# Patient Record
Sex: FEMALE | Race: WHITE
[De-identification: names, ages, dates, MRNs, and addresses within clinical notes are randomized per-mention and may not be internally consistent; named-entity substitution may affect disease eponyms.]

---

## 2020-07-26 ENCOUNTER — HOSPITAL ENCOUNTER (INPATIENT)
Dept: HOSPITAL 43 - DL.ED | Age: 70
LOS: 2 days | Discharge: HOME | DRG: 645 | End: 2020-07-28
Attending: HOSPITALIST | Admitting: HOSPITALIST
Payer: MEDICARE

## 2020-07-26 DIAGNOSIS — Z79.82: ICD-10-CM

## 2020-07-26 DIAGNOSIS — Z20.828: ICD-10-CM

## 2020-07-26 DIAGNOSIS — M19.90: ICD-10-CM

## 2020-07-26 DIAGNOSIS — J32.4: ICD-10-CM

## 2020-07-26 DIAGNOSIS — H54.7: ICD-10-CM

## 2020-07-26 DIAGNOSIS — Z79.899: ICD-10-CM

## 2020-07-26 DIAGNOSIS — E22.2: ICD-10-CM

## 2020-07-26 DIAGNOSIS — E87.8: ICD-10-CM

## 2020-07-26 DIAGNOSIS — R42: ICD-10-CM

## 2020-07-26 DIAGNOSIS — E87.1: Primary | ICD-10-CM

## 2020-07-26 LAB
ANION GAP SERPL CALC-SCNC: 10.8 MEQ/L (ref 7–13)
ANION GAP SERPL CALC-SCNC: 12.7 MEQ/L (ref 7–13)
CHLORIDE SERPL-SCNC: 83 MMOL/L (ref 98–107)
CHLORIDE SERPL-SCNC: 88 MMOL/L (ref 98–107)
SODIUM SERPL-SCNC: 120 MMOL/L (ref 136–145)
SODIUM SERPL-SCNC: 120 MMOL/L (ref 136–145)

## 2020-07-26 PROCEDURE — U0002 COVID-19 LAB TEST NON-CDC: HCPCS

## 2020-07-26 PROCEDURE — 36415 COLL VENOUS BLD VENIPUNCTURE: CPT

## 2020-07-26 PROCEDURE — 99284 EMERGENCY DEPT VISIT MOD MDM: CPT

## 2020-07-26 PROCEDURE — 96360 HYDRATION IV INFUSION INIT: CPT

## 2020-07-26 PROCEDURE — 85025 COMPLETE CBC W/AUTO DIFF WBC: CPT

## 2020-07-26 PROCEDURE — 80053 COMPREHEN METABOLIC PANEL: CPT

## 2020-07-26 RX ADMIN — HEPARIN SODIUM SCH UNITS: 5000 INJECTION, SOLUTION INTRAVENOUS; SUBCUTANEOUS at 21:39

## 2020-07-26 NOTE — EDM.PDOC
ED HPI GENERAL MEDICAL PROBLEM





- General


Chief Complaint: General


Stated Complaint: EXPOSURE TO COVID DIZZY, SHAKEY


Time Seen by Provider: 07/26/20 13:28


Source of Information: Reports: Patient, RN, RN Notes Reviewed


History Limitations: Reports: No Limitations





- History of Present Illness


INITIAL COMMENTS - FREE TEXT/NARRATIVE: 


Patient presents to ER with complaint of feeling dizzy, shaky, and nauseated.  

Patient states she is a  at the rest area, and is afraid of being exposed

to COVID.  Patient states she has had someone else living with her who has 

Crohn's disease and is deathly afraid of getting COVID.  Patient states she has 

had no known exposure to COVID.  States she got overheated outside the other 

day, and feels she may be dehydrated.  Admits to nausea, denies vomiting, denies

fever chills, denies shortness of breath or chest pains.


Onset: Gradual


  ** Headache


Pain Score (Numeric/FACES): 2





- Related Data


                                    Allergies











Allergy/AdvReac Type Severity Reaction Status Date / Time


 


No Known Allergies Allergy   Verified 07/26/20 12:49











Home Meds: 


                                    Home Meds





. [No Known Home Meds]  07/26/20 [History]











Past Medical History





- Past Health History


Medical/Surgical History: Denies Medical/Surgical History





Social & Family History





- Tobacco Use


Smoking Status *Q: Never Smoker


Second Hand Smoke Exposure: No





- Recreational Drug Use


Recreational Drug Use: No





ED ROS GENERAL





- Review of Systems


Review Of Systems: Comprehensive ROS is negative, except as noted in HPI.





ED EXAM, GENERAL





- Physical Exam


Exam: See Below


Exam Limited By: No Limitations


General Appearance: Alert, WD/WN, No Apparent Distress, Anxious


Eye Exam: Bilateral Eye: EOMI, Normal Inspection


Ears: Normal External Exam, Hearing Grossly Normal


Nose: Normal Inspection


Throat/Mouth: Normal Inspection, Normal Voice, No Airway Compromise, Other (Dry 

mucous membranes)


Head: Atraumatic, Normocephalic


Neck: Normal Inspection, Supple, Non-Tender, Full Range of Motion


Respiratory/Chest: No Respiratory Distress, Lungs Clear, Normal Breath Sounds, 

No Accessory Muscle Use, Chest Non-Tender


Cardiovascular: Normal Peripheral Pulses, Regular Rate, Rhythm, No Edema, No 

Gallop, No JVD, No Murmur, No Rub


Peripheral Pulses: 2+: Radial (L), Radial (R)


GI/Abdominal: Normal Bowel Sounds, Soft, Non-Tender


 (Female) Exam: Deferred


Rectal (Female) Exam: Deferred


Back Exam: Normal Inspection, Full Range of Motion, NT


Extremities: Normal Inspection, Normal Range of Motion, Non-Tender, Normal 

Capillary Refill, No Pedal Edema


Neurological: Alert, Oriented, CN II-XII Intact, Normal Cognition, Normal Gait, 

Normal Reflexes, No Motor/Sensory Deficits


Psychiatric: Normal Affect, Normal Mood, Anxious


Skin Exam: Warm, Dry, Intact, Normal Color, No Rash


Lymphatic: No Adenopathy





Course





- Vital Signs


Last Recorded V/S: 


                                Last Vital Signs











Temp  98.6 F   07/26/20 12:40


 


Pulse  86   07/26/20 12:40


 


Resp  20   07/26/20 12:40


 


BP  149/80 H  07/26/20 12:58


 


Pulse Ox  96   07/26/20 12:40














- Orders/Labs/Meds


Orders: 


                               Active Orders 24 hr











 Category Date Time Status


 


 Admission Diagnosis [ADT] Stat ADT  07/26/20 14:37 Ordered


 


 Admission Status [Patient Status] [ADT] Routine ADT  07/26/20 14:37 Active


 


 UA RFX KALEE AND CULT IF INDIC [URIN] Stat Lab  07/26/20 13:34 Ordered











Labs: 


                                Laboratory Tests











  07/26/20 07/26/20 07/26/20 Range/Units





  12:45 13:43 13:43 


 


WBC   6.2   (5.0-10.0)  10^3/uL


 


RBC   5.25   (4.2-5.4)  10^6/uL


 


Hgb   15.7   (12.0-16.0)  g/dL


 


Hct   44.1   (37.0-47.0)  %


 


MCV   84.0  D   ()  fL


 


MCH   29.9   (27.0-34.0)  pg


 


MCHC   35.6 H   (33.0-35.0)  g/dL


 


Plt Count   263   (150-450)  10^3/uL


 


Neut % (Auto)   66.5   (42.2-75.2)  %


 


Lymph % (Auto)   21.5   (20.5-50.1)  %


 


Mono % (Auto)   11.6 H   (2-8)  %


 


Eos % (Auto)   0.2 L   (1.0-3.0)  %


 


Baso % (Auto)   0.2   (0.0-1.0)  %


 


Sodium    120 L  (136-145)  mmol/L


 


Potassium    3.7  (3.5-5.1)  mmol/L


 


Chloride    83 L  ()  mmol/L


 


Carbon Dioxide    28  (21-32)  mmol/L


 


Anion Gap    12.7  (7-13)  mEq/L


 


BUN    12  (7-18)  mg/dL


 


Creatinine    0.67  (0.55-1.02)  mg/dL


 


Est Cr Clr Drug Dosing    53.48  mL/min


 


Estimated GFR (MDRD)    > 60  


 


BUN/Creatinine Ratio    17.9  (No establ ref range)  


 


Glucose    103 H  (74-99)  mg/dL


 


Calcium    9.5  (8.5-10.1)  mg/dL


 


Total Bilirubin    0.8  (0.2-1.0)  mg/dL


 


AST    36  (15-37)  U/L


 


ALT    39  (14-59)  U/L


 


Alkaline Phosphatase    60  ()  U/L


 


Total Protein    8.3 H  (6.4-8.2)  g/dL


 


Albumin    4.4  (3.4-5.0)  g/dL


 


Globulin    3.9  


 


Albumin/Globulin Ratio    1.1  


 


COVID-19 ([facility]RELL)  Negative    (NEGATIVE)  











Meds: 


Medications














Discontinued Medications














Generic Name Dose Route Start Last Admin





  Trade Name Freq  PRN Reason Stop Dose Admin


 


Sodium Chloride  1,000 mls @ 999 mls/hr  07/26/20 13:34  07/26/20 13:46





  Normal Saline  IV  07/26/20 14:34  999 mls/hr





  .BOLUS ONE   Administration














- Re-Assessments/Exams


Free Text/Narrative Re-Assessment/Exam: 





07/26/20 14:39


Discussed patient case with Dr. Melgoza who agreed to accept the patient for 

inpatient admission. 








Departure





- Departure


Time of Disposition: 14:39


Disposition: Admitted As Inpatient 67


Condition: Fair


Clinical Impression: 


 Hyponatremia, Hypochloremia








- Discharge Information


*PRESCRIPTION DRUG MONITORING PROGRAM REVIEWED*: No


*COPY OF PRESCRIPTION DRUG MONITORING REPORT IN PATIENT ELOY: No


Forms:  ED Department Discharge





Sepsis Event Note (ED)





- Evaluation


Sepsis Screening Result: No Definite Risk





- Focused Exam


Vital Signs: 


                                   Vital Signs











  Temp Pulse Resp BP Pulse Ox


 


 07/26/20 12:58     149/80 H 


 


 07/26/20 12:40  98.6 F  86  20   96














- My Orders


Last 24 Hours: 


My Active Orders





07/26/20 13:34


UA RFX KALEE AND CULT IF INDIC [URIN] Stat 





07/26/20 14:37


Admission Diagnosis [ADT] Stat 


Admission Status [Patient Status] [ADT] Routine 














- Assessment/Plan


Last 24 Hours: 


My Active Orders





07/26/20 13:34


UA RFX KALEE AND CULT IF INDIC [URIN] Stat 





07/26/20 14:37


Admission Diagnosis [ADT] Stat 


Admission Status [Patient Status] [ADT] Routine

## 2020-07-26 NOTE — CT
PROCEDURE INFORMATION: 

Exam: CT Head Without Contrast 

Exam date and time: 7/26/2020 5:15 PM 

Age: 70 years old 

Clinical indication: Pain; Headache 



TECHNIQUE: 

Imaging protocol: Computed tomography of the head without contrast. 

Radiation optimization: All CT scans at this facility use at least one of these 

dose optimization techniques: automated exposure control; mA and/or kV 

adjustment per patient size (includes targeted exams where dose is matched to 

clinical indication); or iterative reconstruction. 



COMPARISON: 

No relevant prior studies available. 



FINDINGS: 

Brain: Normal. No hemorrhage. Unremarkable white matter. No mass effect. 

Ventricles: Normal. No ventriculomegaly. 



Bones/joints:  No acute fracture. 

Sinuses: Visualized sinuses are unremarkable. No fluid levels. 

Mastoid air cells: Visualized mastoid air cells are well aerated. 

Soft tissues: No acute changes 



IMPRESSION: 

No acute intracranial abnormality.

## 2020-07-26 NOTE — PCM.HP
H&P History of Present Illness





- General


Date of Service: 07/26/20


Admit Problem/Dx: 


                           Admission Diagnosis/Problem





Admission Diagnosis/Problem      Hyponatremia








Source of Information: Patient


History Limitations: Reports: No Limitations





- History of Present Illness


Initial Comments - Free Text/Narative: 





This is a 70-year-old female with no significant past medical history. The pat

jose presented to the emergency room because of dizziness which has been going 

on for at least the past 2 weeks. It has gradually worsened. She describes it as

her head feeling congested. She does of associated headache which is mild but 

constant and has not responded to oral over-the-counter analgesics. She has 

associated nausea and dry heaves. Has not been vomiting. Also has not had 

diarrhea or constipation. Her appetite in the past 2 days has not been great. 

Patient denies dysuria or frequency or micturition. Denies significant cough or 

wheezing. She does not think she has lost any significant weight. In the 

emergency room the patient was noted to be hyponatremic with sodium of 120.


  ** Headache


Pain Score (Numeric/FACES): 2





- Related Data


Allergies/Adverse Reactions: 


                                    Allergies











Allergy/AdvReac Type Severity Reaction Status Date / Time


 


No Known Allergies Allergy   Verified 07/26/20 15:27











Home Medications: 


                                    Home Meds





. [No Known Home Meds]  07/26/20 [History]











Past Medical History





- Past Health History


Medical/Surgical History: Denies Medical/Surgical History


HEENT History: Reports: Impaired Vision


Musculoskeletal History: Reports: Arthritis





- Infectious Disease History


Infectious Disease History: Reports: Chicken Pox





Social & Family History





- Tobacco Use


Smoking Status *Q: Never Smoker


Second Hand Smoke Exposure: No





- Caffeine Use


Caffeine Use: Reports: Coffee





- Recreational Drug Use


Recreational Drug Use: No





H&P Review of Systems





- Review of Systems:


Review Of Systems: See Below


General: Reports: Malaise, Weakness, Fatigue


HEENT: Reports: No Symptoms


Pulmonary: Reports: No Symptoms


Cardiovascular: Reports: No Symptoms


Gastrointestinal: Reports: No Symptoms


Genitourinary: Reports: No Symptoms


Musculoskeletal: Reports: No Symptoms


Psychiatric: Reports: No Symptoms


Neurological: Reports: Dizziness, Headache, Weakness





Exam





- Exam


Exam: See Below





- Vital Signs


Vital Signs: 


                                Last Vital Signs











Temp  36.4 C   07/26/20 14:46


 


Pulse  68   07/26/20 14:46


 


Resp  20   07/26/20 14:46


 


BP  139/82   07/26/20 14:46


 


Pulse Ox  100   07/26/20 14:46











Weight: 43.001 kg





- Exam


General: Alert, Oriented, Cooperative


Neck: Supple, Trachea Midline, 2


Lungs: Clear to Auscultation


Cardiovascular: Regular Rate, Regular Rhythm, Normal S1


GI/Abdominal Exam: Normal Bowel Sounds, Soft, Non-Tender, No Organomegaly, No 

Distention, No Abnormal Bruit, No Mass, Pelvis Stable


Extremities: Normal Inspection, Normal Range of Motion, Non-Tender, No Pedal 

Edema, Normal Capillary Refill


Skin: Warm, Dry, Intact


Psychiatric: Alert, Normal Affect, Normal Mood





- Patient Data


Lab Results Last 24 hrs: 


                         Laboratory Results - last 24 hr











  07/26/20 07/26/20 07/26/20 Range/Units





  12:45 13:43 13:43 


 


WBC   6.2   (5.0-10.0)  10^3/uL


 


RBC   5.25   (4.2-5.4)  10^6/uL


 


Hgb   15.7   (12.0-16.0)  g/dL


 


Hct   44.1   (37.0-47.0)  %


 


MCV   84.0  D   ()  fL


 


MCH   29.9   (27.0-34.0)  pg


 


MCHC   35.6 H   (33.0-35.0)  g/dL


 


Plt Count   263   (150-450)  10^3/uL


 


Neut % (Auto)   66.5   (42.2-75.2)  %


 


Lymph % (Auto)   21.5   (20.5-50.1)  %


 


Mono % (Auto)   11.6 H   (2-8)  %


 


Eos % (Auto)   0.2 L   (1.0-3.0)  %


 


Baso % (Auto)   0.2   (0.0-1.0)  %


 


Sodium    120 L  (136-145)  mmol/L


 


Potassium    3.7  (3.5-5.1)  mmol/L


 


Chloride    83 L  ()  mmol/L


 


Carbon Dioxide    28  (21-32)  mmol/L


 


Anion Gap    12.7  (7-13)  mEq/L


 


BUN    12  (7-18)  mg/dL


 


Creatinine    0.67  (0.55-1.02)  mg/dL


 


Est Cr Clr Drug Dosing    53.48  mL/min


 


Estimated GFR (MDRD)    > 60  


 


BUN/Creatinine Ratio    17.9  (No establ ref range)  


 


Glucose    103 H  (74-99)  mg/dL


 


Calcium    9.5  (8.5-10.1)  mg/dL


 


Total Bilirubin    0.8  (0.2-1.0)  mg/dL


 


AST    36  (15-37)  U/L


 


ALT    39  (14-59)  U/L


 


Alkaline Phosphatase    60  ()  U/L


 


Total Protein    8.3 H  (6.4-8.2)  g/dL


 


Albumin    4.4  (3.4-5.0)  g/dL


 


Globulin    3.9  


 


Albumin/Globulin Ratio    1.1  


 


Urine Color     (YELLOW)  


 


Urine Appearance     (CLEAR)  


 


Urine pH     (5.0-9.0)  


 


Ur Specific Gravity     (1.005-1.030)  


 


Urine Protein     (NEGATIVE)  


 


Urine Glucose (UA)     (NEGATIVE)  


 


Urine Ketones     (NEGATIVE)  


 


Urine Occult Blood     (NEGATIVE)  


 


Urine Nitrite     (NEGATIVE)  


 


Urine Bilirubin     (NEGATIVE)  


 


Urine Urobilinogen     (0.2-1.0)  mg/dL


 


Ur Leukocyte Esterase     (NEGATIVE)  


 


Urine RBC     /HPF


 


Urine WBC     (0-5/HPF)  /HPF


 


Ur Epithelial Cells     (NOT SEEN)  /HPF


 


Urine Bacteria     (0-FEW/HPF)  /HPF


 


COVID-19 ([facility])  Negative    (NEGATIVE)  














  07/26/20 Range/Units





  16:00 


 


WBC   (5.0-10.0)  10^3/uL


 


RBC   (4.2-5.4)  10^6/uL


 


Hgb   (12.0-16.0)  g/dL


 


Hct   (37.0-47.0)  %


 


MCV   ()  fL


 


MCH   (27.0-34.0)  pg


 


MCHC   (33.0-35.0)  g/dL


 


Plt Count   (150-450)  10^3/uL


 


Neut % (Auto)   (42.2-75.2)  %


 


Lymph % (Auto)   (20.5-50.1)  %


 


Mono % (Auto)   (2-8)  %


 


Eos % (Auto)   (1.0-3.0)  %


 


Baso % (Auto)   (0.0-1.0)  %


 


Sodium   (136-145)  mmol/L


 


Potassium   (3.5-5.1)  mmol/L


 


Chloride   ()  mmol/L


 


Carbon Dioxide   (21-32)  mmol/L


 


Anion Gap   (7-13)  mEq/L


 


BUN   (7-18)  mg/dL


 


Creatinine   (0.55-1.02)  mg/dL


 


Est Cr Clr Drug Dosing   mL/min


 


Estimated GFR (MDRD)   


 


BUN/Creatinine Ratio   (No establ ref range)  


 


Glucose   (74-99)  mg/dL


 


Calcium   (8.5-10.1)  mg/dL


 


Total Bilirubin   (0.2-1.0)  mg/dL


 


AST   (15-37)  U/L


 


ALT   (14-59)  U/L


 


Alkaline Phosphatase   ()  U/L


 


Total Protein   (6.4-8.2)  g/dL


 


Albumin   (3.4-5.0)  g/dL


 


Globulin   


 


Albumin/Globulin Ratio   


 


Urine Color  Yellow  (YELLOW)  


 


Urine Appearance  Clear  (CLEAR)  


 


Urine pH  6.5  (5.0-9.0)  


 


Ur Specific Gravity  1.025  (1.005-1.030)  


 


Urine Protein  Trace H  (NEGATIVE)  


 


Urine Glucose (UA)  Negative  (NEGATIVE)  


 


Urine Ketones  15 H  (NEGATIVE)  


 


Urine Occult Blood  Trace-lysed H  (NEGATIVE)  


 


Urine Nitrite  Negative  (NEGATIVE)  


 


Urine Bilirubin  Negative  (NEGATIVE)  


 


Urine Urobilinogen  0.2  (0.2-1.0)  mg/dL


 


Ur Leukocyte Esterase  Negative  (NEGATIVE)  


 


Urine RBC  0-5  /HPF


 


Urine WBC  0-5  (0-5/HPF)  /HPF


 


Ur Epithelial Cells  Rare  (NOT SEEN)  /HPF


 


Urine Bacteria  Not seen  (0-FEW/HPF)  /HPF


 


COVID-19 ([facility])   (NEGATIVE)  











Result Diagrams: 


                                 07/26/20 13:43





                                 07/26/20 13:43


Problem List Initiated/Reviewed/Updated: Yes


Orders Last 24hrs: 


                               Active Orders 24 hr











 Category Date Time Status


 


 Admission Diagnosis [ADT] Stat ADT  07/26/20 14:37 Ordered


 


 Admission Status [Patient Status] [ADT] Routine ADT  07/26/20 14:37 Active


 


 Patient Status [ADT] Routine ADT  07/26/20 14:46 Active


 


 Cardiac Monitoring [RC] 08,20 Care  07/26/20 14:48 Active


 


 Up ad Esther [RC] ASDIRECTED Care  07/26/20 14:46 Active


 


 VTE/DVT Education [RC] PER UNIT ROUTINE Care  07/26/20 14:46 Active


 


 Vital Signs [RC] 00,04,08,12,16,20 Care  07/26/20 14:46 Active


 


 PT Evaluation and Treatment [CONS] Routine Cons  07/26/20 14:46 Active


 


 Regular Diet [DIET] Diet  07/26/20 Lunch Active


 


 CXR [Chest 1V Frontal] [CR] Routine Exams  07/26/20 16:55 Ordered


 


 Head wo Cont [CT] Routine Exams  07/26/20 16:56 Ordered


 


 BASIC METABOLIC PANEL,BMP [CHEM] AM Lab  07/27/20 05:11 Ordered


 


 BASIC METABOLIC PANEL,BMP [CHEM] Q6H Lab  07/26/20 20:53 Ordered


 


 CBC W/O DIFF,HEMOGRAM [HEME] AM Lab  07/27/20 05:11 Ordered


 


 Acetaminophen [Tylenol] Med  07/26/20 14:46 Active





 650 mg PO Q4H PRN   


 


 Heparin Sodium Med  07/26/20 22:00 Active





 5,000 units SUBCUT Q8HR   


 


 Ondansetron [Zofran] Med  07/26/20 14:46 Active





 4 mg IVPUSH Q6H PRN   


 


 Sodium Chloride 0.9% [Normal Saline] 1,000 ml Med  07/26/20 15:00 Active





 IV ASDIRECTED   


 


 Resuscitation Status Routine Resus Stat  07/26/20 14:46 Ordered








                                Medication Orders





Acetaminophen (Tylenol)  650 mg PO Q4H PRN


   PRN Reason: Pain (Mild 1-3)/fever


Heparin Sodium (Porcine) (Heparin Sodium)  5,000 units SUBCUT Q8HR JENNIFFER


Sodium Chloride (Normal Saline)  1,000 mls @ 125 mls/hr IV ASDIRECTED JENNIFFER


   Last Admin: 07/26/20 15:45  Dose: 125 mls/hr


   Documented by: JEWEL


Ondansetron HCl (Zofran)  4 mg IVPUSH Q6H PRN


   PRN Reason: Nausea/Vomiting


   Last Admin: 07/26/20 15:55  Dose: 4 mg


   Documented by: JEWEL








Assessment/Plan Comment:: 





Assessment/plan:


#. Hyponatremia


Reason for this is not obvious at this point


It may be due to SIADH. The patient is having nausea and dry heaves.


Has not vomited and does not have diarrhea





#. Dizziness


Etiology is unclear at this point


Patient does complain of associated headache


I'll like to exclude central nervous system disorder.





#. Nausea and dry heaves


Again, etiology remains elusive





Plan:


Admit patient to medical floor


Send sample for around a urine sodium


Send sample for random urine creatinine


Obtain CT scan of the head


Obtain chest x-ray


Obtain serial basic metabolic panel every 6 hours


Start patient on intravenous normal saline going at 125 mL an hour





Patient's medical chart was reviewed. Discussed with the emergency room 

physician assistant.

## 2020-07-26 NOTE — CR
PROCEDURE INFORMATION: 

Exam: XR Chest, 2 Views 

Exam date and time: 7/26/2020 4:58 PM 

Age: 70 years old 

Clinical indication: Other: Weakness 



TECHNIQUE: 

Imaging protocol: XR of the chest 

Views: 2 views. 



COMPARISON: 

No relevant prior studies available. 



FINDINGS: 

Lungs: The lungs are hyperinflated, consistent with underlying small airways 

disease. Atelectatic changes noted within the lung bases without focal 

pneumonia. 

Pleural space: Unremarkable. No pleural effusion. No pneumothorax. 

Heart/Mediastinum: The heart demonstrates mild diffuse enlargement. 

Bones/joints: Unremarkable. 



IMPRESSION: 

1. The lungs are hyperinflated, consistent with underlying small airways 

disease. 

2. Atelectatic changes noted within the lung bases without focal pneumonia.

## 2020-07-27 LAB
ANION GAP SERPL CALC-SCNC: 11.5 MEQ/L (ref 7–13)
ANION GAP SERPL CALC-SCNC: 8.8 MEQ/L (ref 7–13)
CHLORIDE SERPL-SCNC: 102 MMOL/L (ref 98–107)
CHLORIDE SERPL-SCNC: 95 MMOL/L (ref 98–107)
SODIUM SERPL-SCNC: 128 MMOL/L (ref 136–145)
SODIUM SERPL-SCNC: 136 MMOL/L (ref 136–145)

## 2020-07-27 RX ADMIN — HEPARIN SODIUM SCH UNITS: 5000 INJECTION, SOLUTION INTRAVENOUS; SUBCUTANEOUS at 13:24

## 2020-07-27 RX ADMIN — HEPARIN SODIUM SCH UNITS: 5000 INJECTION, SOLUTION INTRAVENOUS; SUBCUTANEOUS at 21:46

## 2020-07-27 RX ADMIN — HEPARIN SODIUM SCH UNITS: 5000 INJECTION, SOLUTION INTRAVENOUS; SUBCUTANEOUS at 06:04

## 2020-07-27 NOTE — MR
EXAMINATION: Brain wo Cont  SEX: Female   AGE: 70 years

 

CLINICAL HISTORY: 70-year-old female complaining of persistent dizziness. CT

scan head 26 July 2020 read as "unremarkable. No acute intracranial

abnormality".

 

SCAN TECHNIQUE: Unenhanced sagittal T1 and multisequence (T1/T2/FLAIR/diffusion

weighted imaging) axial images of the head and brain obtained with the patient

lying supine on the [employer] magnet [place], [place]. All data archived in the PACS system for storage,

reformatting and study.

 

INTERPRETATION:

1. Pansinusitis, i.e. mucoperiosteal inflammatory changes involving maxillary

antra and ethmoid sinuses bilaterally (sphenoid and mastoid sinuses clear).

2. Scattered small bright signals identified throughout the periventricular

white matter of both cerebral hemispheres typical of microvascular ischemic

change. Hypertension? Diabetes?

3. No cerebral edema or areas of encephalomalacia (old infarcts). No

demyelinization. No arachnoid cysts.

4. Symmetric normal volume ventricular system. No hydrocephalus.

5. No supratentorial or posterior fossa mass lesion.

6. Cerebellum and brainstem unremarkable.

7. No sign of acute intracranial bleed, i.e. no intracerebral, intraventricular

or subarachnoid blood and no abnormal extracerebral/intracranial epidural or

subdural hematomas.

 

CONCLUSION: Chronic multi-infarct ischemic disease. Pansinusitis.

No intracranial mass, hydrocephalus or bleed.

## 2020-07-27 NOTE — PCM.PN
- General Info


Date of Service: 07/27/20


Subjective Update: 





Patient was admitted with hyponatremia sodium of 120. She was having dizziness, 

generalized weakness and felt unsteady.


Today she indicates that she feels better. Continue to have some dizziness but 

improved compared to yesterday





- Review of Systems


General: Reports: Malaise


HEENT: Reports: No Symptoms


Pulmonary: Reports: No Symptoms


Cardiovascular: Reports: No Symptoms


Gastrointestinal: Reports: No Symptoms


Musculoskeletal: Reports: No Symptoms





- Patient Data


Vitals - Most Recent: 


                                Last Vital Signs











Temp  36.8 C   07/27/20 08:14


 


Pulse  76   07/27/20 08:14


 


Resp  20   07/27/20 08:14


 


BP  132/64   07/27/20 08:14


 


Pulse Ox  96   07/27/20 08:14











Weight - Most Recent: 42.751 kg


I&O - Last 24 Hours: 


                                 Intake & Output











 07/26/20 07/27/20 07/27/20





 22:59 06:59 14:59


 


Intake Total 240 125 


 


Output Total  1100 


 


Balance 240 -975 











Lab Results Last 24 Hours: 


                         Laboratory Results - last 24 hr











  07/26/20 07/26/20 07/26/20 Range/Units





  12:45 13:43 13:43 


 


WBC   6.2   (5.0-10.0)  10^3/uL


 


RBC   5.25   (4.2-5.4)  10^6/uL


 


Hgb   15.7   (12.0-16.0)  g/dL


 


Hct   44.1   (37.0-47.0)  %


 


MCV   84.0  D   ()  fL


 


MCH   29.9   (27.0-34.0)  pg


 


MCHC   35.6 H   (33.0-35.0)  g/dL


 


Plt Count   263   (150-450)  10^3/uL


 


Neut % (Auto)   66.5   (42.2-75.2)  %


 


Lymph % (Auto)   21.5   (20.5-50.1)  %


 


Mono % (Auto)   11.6 H   (2-8)  %


 


Eos % (Auto)   0.2 L   (1.0-3.0)  %


 


Baso % (Auto)   0.2   (0.0-1.0)  %


 


Sodium    120 L  (136-145)  mmol/L


 


Potassium    3.7  (3.5-5.1)  mmol/L


 


Chloride    83 L  ()  mmol/L


 


Carbon Dioxide    28  (21-32)  mmol/L


 


Anion Gap    12.7  (7-13)  mEq/L


 


BUN    12  (7-18)  mg/dL


 


Creatinine    0.67  (0.55-1.02)  mg/dL


 


Est Cr Clr Drug Dosing    53.48  mL/min


 


Estimated GFR (MDRD)    > 60  


 


BUN/Creatinine Ratio    17.9  (No establ ref range)  


 


Glucose    103 H  (74-99)  mg/dL


 


Calcium    9.5  (8.5-10.1)  mg/dL


 


Total Bilirubin    0.8  (0.2-1.0)  mg/dL


 


AST    36  (15-37)  U/L


 


ALT    39  (14-59)  U/L


 


Alkaline Phosphatase    60  ()  U/L


 


Total Protein    8.3 H  (6.4-8.2)  g/dL


 


Albumin    4.4  (3.4-5.0)  g/dL


 


Globulin    3.9  


 


Albumin/Globulin Ratio    1.1  


 


Urine Color     (YELLOW)  


 


Urine Appearance     (CLEAR)  


 


Urine pH     (5.0-9.0)  


 


Ur Specific Gravity     (1.005-1.030)  


 


Urine Protein     (NEGATIVE)  


 


Urine Glucose (UA)     (NEGATIVE)  


 


Urine Ketones     (NEGATIVE)  


 


Urine Occult Blood     (NEGATIVE)  


 


Urine Nitrite     (NEGATIVE)  


 


Urine Bilirubin     (NEGATIVE)  


 


Urine Urobilinogen     (0.2-1.0)  mg/dL


 


Ur Leukocyte Esterase     (NEGATIVE)  


 


Urine RBC     /HPF


 


Urine WBC     (0-5/HPF)  /HPF


 


Ur Epithelial Cells     (NOT SEEN)  /HPF


 


Urine Bacteria     (0-FEW/HPF)  /HPF


 


Ur Random Creatinine     (No establ ref range)  mg/dL


 


Ur Random Sodium     (No establ.ref range)  mmol/L


 


COVID-19 (J[facility]RELL)  Negative    (NEGATIVE)  














  07/26/20 07/26/20 07/26/20 Range/Units





  16:00 16:00 16:00 


 


WBC     (5.0-10.0)  10^3/uL


 


RBC     (4.2-5.4)  10^6/uL


 


Hgb     (12.0-16.0)  g/dL


 


Hct     (37.0-47.0)  %


 


MCV     ()  fL


 


MCH     (27.0-34.0)  pg


 


MCHC     (33.0-35.0)  g/dL


 


Plt Count     (150-450)  10^3/uL


 


Neut % (Auto)     (42.2-75.2)  %


 


Lymph % (Auto)     (20.5-50.1)  %


 


Mono % (Auto)     (2-8)  %


 


Eos % (Auto)     (1.0-3.0)  %


 


Baso % (Auto)     (0.0-1.0)  %


 


Sodium     (136-145)  mmol/L


 


Potassium     (3.5-5.1)  mmol/L


 


Chloride     ()  mmol/L


 


Carbon Dioxide     (21-32)  mmol/L


 


Anion Gap     (7-13)  mEq/L


 


BUN     (7-18)  mg/dL


 


Creatinine     (0.55-1.02)  mg/dL


 


Est Cr Clr Drug Dosing     mL/min


 


Estimated GFR (MDRD)     


 


BUN/Creatinine Ratio     (No establ ref range)  


 


Glucose     (74-99)  mg/dL


 


Calcium     (8.5-10.1)  mg/dL


 


Total Bilirubin     (0.2-1.0)  mg/dL


 


AST     (15-37)  U/L


 


ALT     (14-59)  U/L


 


Alkaline Phosphatase     ()  U/L


 


Total Protein     (6.4-8.2)  g/dL


 


Albumin     (3.4-5.0)  g/dL


 


Globulin     


 


Albumin/Globulin Ratio     


 


Urine Color  Yellow    (YELLOW)  


 


Urine Appearance  Clear    (CLEAR)  


 


Urine pH  6.5    (5.0-9.0)  


 


Ur Specific Gravity  1.025    (1.005-1.030)  


 


Urine Protein  Trace H    (NEGATIVE)  


 


Urine Glucose (UA)  Negative    (NEGATIVE)  


 


Urine Ketones  15 H    (NEGATIVE)  


 


Urine Occult Blood  Trace-lysed H    (NEGATIVE)  


 


Urine Nitrite  Negative    (NEGATIVE)  


 


Urine Bilirubin  Negative    (NEGATIVE)  


 


Urine Urobilinogen  0.2    (0.2-1.0)  mg/dL


 


Ur Leukocyte Esterase  Negative    (NEGATIVE)  


 


Urine RBC  0-5    /HPF


 


Urine WBC  0-5    (0-5/HPF)  /HPF


 


Ur Epithelial Cells  Rare    (NOT SEEN)  /HPF


 


Urine Bacteria  Not seen    (0-FEW/HPF)  /HPF


 


Ur Random Creatinine   57.04   (No establ ref range)  mg/dL


 


Ur Random Sodium    88  (No establ.ref range)  mmol/L


 


COVID-19 ([facility])     (NEGATIVE)  














  07/26/20 07/27/20 07/27/20 Range/Units





  21:00 03:00 03:00 


 


WBC   4.5 L   (5.0-10.0)  10^3/uL


 


RBC   4.32   (4.2-5.4)  10^6/uL


 


Hgb   12.9  D   (12.0-16.0)  g/dL


 


Hct   36.9 L   (37.0-47.0)  %


 


MCV   85.4   ()  fL


 


MCH   29.9   (27.0-34.0)  pg


 


MCHC   35.0   (33.0-35.0)  g/dL


 


Plt Count   203   (150-450)  10^3/uL


 


Neut % (Auto)     (42.2-75.2)  %


 


Lymph % (Auto)     (20.5-50.1)  %


 


Mono % (Auto)     (2-8)  %


 


Eos % (Auto)     (1.0-3.0)  %


 


Baso % (Auto)     (0.0-1.0)  %


 


Sodium  120 L   128 L  (136-145)  mmol/L


 


Potassium  3.8   3.8  (3.5-5.1)  mmol/L


 


Chloride  88 L   95 L  ()  mmol/L


 


Carbon Dioxide  25   28  (21-32)  mmol/L


 


Anion Gap  10.8   8.8  (7-13)  mEq/L


 


BUN  12   11  (7-18)  mg/dL


 


Creatinine  0.63   0.66  (0.55-1.02)  mg/dL


 


Est Cr Clr Drug Dosing  57.21   54.61  mL/min


 


Estimated GFR (MDRD)  > 60   > 60  


 


BUN/Creatinine Ratio     (No establ ref range)  


 


Glucose  103 H   87  (74-99)  mg/dL


 


Calcium  7.9 L D   7.9 L  (8.5-10.1)  mg/dL


 


Total Bilirubin     (0.2-1.0)  mg/dL


 


AST     (15-37)  U/L


 


ALT     (14-59)  U/L


 


Alkaline Phosphatase     ()  U/L


 


Total Protein     (6.4-8.2)  g/dL


 


Albumin     (3.4-5.0)  g/dL


 


Globulin     


 


Albumin/Globulin Ratio     


 


Urine Color     (YELLOW)  


 


Urine Appearance     (CLEAR)  


 


Urine pH     (5.0-9.0)  


 


Ur Specific Gravity     (1.005-1.030)  


 


Urine Protein     (NEGATIVE)  


 


Urine Glucose (UA)     (NEGATIVE)  


 


Urine Ketones     (NEGATIVE)  


 


Urine Occult Blood     (NEGATIVE)  


 


Urine Nitrite     (NEGATIVE)  


 


Urine Bilirubin     (NEGATIVE)  


 


Urine Urobilinogen     (0.2-1.0)  mg/dL


 


Ur Leukocyte Esterase     (NEGATIVE)  


 


Urine RBC     /HPF


 


Urine WBC     (0-5/HPF)  /HPF


 


Ur Epithelial Cells     (NOT SEEN)  /HPF


 


Urine Bacteria     (0-FEW/HPF)  /HPF


 


Ur Random Creatinine     (No establ ref range)  mg/dL


 


Ur Random Sodium     (No establ.ref range)  mmol/L


 


COVID-19 ([facility])     (NEGATIVE)  











Med Orders - Current: 


                               Current Medications





Acetaminophen (Tylenol)  650 mg PO Q4H PRN


   PRN Reason: Pain (Mild 1-3)/fever


Heparin Sodium (Porcine) (Heparin Sodium)  5,000 units SUBCUT Q8HR [facility]


   Last Admin: 07/27/20 06:04 Dose:  5,000 units


   Documented by: 


Ondansetron HCl (Zofran)  4 mg IVPUSH Q6H PRN


   PRN Reason: Nausea/Vomiting


   Last Admin: 07/26/20 15:55 Dose:  4 mg


   Documented by: 


Sodium Chloride (Sodium Chloride)  1 gm PO TID [facility]


   Last Admin: 07/27/20 08:34 Dose:  1 gm


   Documented by: 





Discontinued Medications





Sodium Chloride (Normal Saline)  1,000 mls @ 999 mls/hr IV .BOLUS ONE


   Stop: 07/26/20 14:34


   Last Admin: 07/26/20 13:46 Dose:  999 mls/hr


   Documented by: 


Sodium Chloride (Normal Saline)  1,000 mls @ 75 mls/hr IV ASDIRECTED [facility]


   Last Admin: 07/27/20 00:54 Dose:  75 mls/hr


   Documented by: 











- Exam


General: Alert, Oriented, Cooperative


HEENT: Pupils Equal, Pupils Reactive, EOMI, Mucous Membr. Moist/Pink


Lungs: Clear to Auscultation, Normal Respiratory Effort


Cardiovascular: Regular Rate, Regular Rhythm


GI/Abdominal Exam: Normal Bowel Sounds, Soft, Non-Tender, No Organomegaly, No 

Distention, No Abnormal Bruit, No Mass, Pelvis Stable


Extremities: Normal Inspection, Normal Range of Motion, Non-Tender, No Pedal 

Edema, Normal Capillary Refill





Sepsis Event Note





- Evaluation


Sepsis Screening Result: No Definite Risk





- Focused Exam


Vital Signs: 


                                   Vital Signs











  Temp Pulse Resp BP Pulse Ox


 


 07/27/20 08:14  36.8 C  76  20  132/64  96


 


 07/27/20 04:00  36.4 C  62  18  114/60  97











Date Exam was Performed: 07/27/20


Time Exam was Performed: 10:29





- Problem List Review


Problem List Initiated/Reviewed/Updated: Yes





- My Orders


Last 24 Hours: 


My Active Orders





07/26/20 Lunch


Regular Diet [DIET] 





07/26/20 14:46


Patient Status [ADT] Routine 


Up ad Esther [RC] ASDIRECTED 


VTE/DVT Education [RC] PER UNIT ROUTINE 


Vital Signs [RC] 00,04,08,12,16,20 


PT Evaluation and Treatment [CONS] Routine 


Acetaminophen [Tylenol]   650 mg PO Q4H PRN 


Ondansetron [Zofran]   4 mg IVPUSH Q6H PRN 


Resuscitation Status Routine 





07/26/20 22:00


Heparin Sodium   5,000 units SUBCUT Q8HR 





07/26/20 22:06


Sodium Chloride   1 gm PO TID 





07/27/20 Breakfast


Fluid Restriction [DIET] 





07/27/20 09:30


BASIC METABOLIC PANEL,BMP [CHEM] Routine 





07/27/20 10:22


Brain wo Cont [MR] Routine 





07/28/20 05:11


BASIC METABOLIC PANEL,BMP [CHEM] AM 














- Plan


Plan:: 





Assessment/plan:


#. Hyponatremia





Likely due to SIADH





#. Dizziness


Etiology is unclear at this point


Patient does complain of associated headache


I'll like to exclude central nervous system disorder.





#. Nausea and dry heaves


Again, etiology remains elusive





Plan:


CT scan of the head obtained. No acute intracranial abnormality


Discontinue intravenous fluids


Discontinue telemetry


Obtain MRI of the brain

## 2020-07-28 LAB
ANION GAP SERPL CALC-SCNC: 10.1 MEQ/L (ref 7–13)
CHLORIDE SERPL-SCNC: 103 MMOL/L (ref 98–107)
SODIUM SERPL-SCNC: 140 MMOL/L (ref 136–145)

## 2020-07-28 RX ADMIN — HEPARIN SODIUM SCH: 5000 INJECTION, SOLUTION INTRAVENOUS; SUBCUTANEOUS at 15:06

## 2020-07-28 RX ADMIN — HEPARIN SODIUM SCH UNITS: 5000 INJECTION, SOLUTION INTRAVENOUS; SUBCUTANEOUS at 06:29

## 2020-07-28 NOTE — PCM.[id number]
**Discharge Summary





- Hospital Course


Free Text/Narrative:: 





Patient was admitted with hyponatremia sodium of 120. She was having dizziness, 

generalized weakness and felt unsteady. The patient had a CT scan of the head 

which was unremarkable. Continued to complain of dizziness and lightheadedness. 

Was complaining of pain around the left temporal region. I proceeded to obtain 

MRI of the brain. That showed changes of chronic cerebrovascular accident. 

Patient was placed on fluid restriction and salt tablets. Serum sodium is back 

to normal. She has been started on medications for prevention of cerebrovascular

accident. She will have outpatient carotid ultrasound and echocardiogram for 

complete workup of cerebrovascular accident.





#. Hyponatremia





Likely due to SIADH





#. Dizziness


#. Pansinusitis


#. Nausea and dry heaves


Again, etiology remains elusive





- Discharge Data


Discharge Date: 07/28/20


Discharge Disposition: Home, Self-Care 01


Condition: Stable





- Referral to Home Health


Primary Care Physician: 


Katie Quiles NP








- Patient Summary/Data


Consults: 


                                  Consultations





07/26/20 14:46


PT Evaluation and Treatment [CONS] Routine 














- Patient Instructions


Diet: Usual Diet as Tolerated


Fluid Restriction: 1500 mL


Activity: As Tolerated


Driving: [address] Today


Other/Special Instructions: BMP in one week





- Discharge Plan


*PRESCRIPTION DRUG MONITORING PROGRAM REVIEWED*: No


*COPY OF PRESCRIPTION DRUG MONITORING REPORT IN PATIENT ELOY: No


Prescriptions/Med Rec: 


Aspirin [Aspirin EC] 81 mg PO DAILY 90 Days #90 tablet.


atorvaSTATin Calcium [Lipitor] 20 mg PO DAILY 30 Days #30 tablet


Sodium Chloride 1 gm PO BID 30 Days #60 tablet


Home Medications: 


                                    Home Meds





Aspirin [Aspirin EC] 81 mg PO DAILY 90 Days #90 tablet. 07/28/20 [Rx]


Sodium Chloride 1 gm PO BID 30 Days #60 tablet 07/28/20 [Rx]


atorvaSTATin Calcium [Lipitor] 20 mg PO DAILY 30 Days #30 tablet 07/28/20 [Rx]








Referrals: 


PCP,None [Ordering Only Provider] - 





- Discharge Summary/Plan Comment


DC Time >30 min.: No





- Review of Systems


General: Reports: Malaise


HEENT: Reports: Headaches


Pulmonary: Reports: No Symptoms


Cardiovascular: Reports: No Symptoms


Gastrointestinal: Reports: No Symptoms





- Patient Data


Vitals - Most Recent: 


                                Last Vital Signs











Temp  37.1 C   07/28/20 07:12


 


Pulse  82   07/28/20 07:12


 


Resp  20   07/28/20 07:12


 


BP  148/86 H  07/28/20 07:12


 


Pulse Ox  97   07/28/20 07:12











Weight - Most Recent: 41.277 kg


I&O - Last 24 hours: 


                                 Intake & Output











 07/27/20 07/28/20 07/28/20





 22:59 06:59 14:59


 


Intake Total  100 360


 


Balance  100 360











Lab Results - Last 24 hrs: 


                         Laboratory Results - last 24 hr











  07/28/20 Range/Units





  06:34 


 


Sodium  140  (136-145)  mmol/L


 


Potassium  3.1 L  (3.5-5.1)  mmol/L


 


Chloride  103  ()  mmol/L


 


Carbon Dioxide  30  (21-32)  mmol/L


 


Anion Gap  10.1  (7-13)  mEq/L


 


BUN  13  (7-18)  mg/dL


 


Creatinine  0.61  (0.55-1.02)  mg/dL


 


Est Cr Clr Drug Dosing  56.72  mL/min


 


Estimated GFR (MDRD)  > 60  


 


Glucose  93  (74-99)  mg/dL


 


Calcium  8.6  (8.5-10.1)  mg/dL











Med Orders - Current: 


                               Current Medications





Acetaminophen (Tylenol)  650 mg PO Q4H PRN


   PRN Reason: Pain (Mild 1-3)/fever


Heparin Sodium (Porcine) (Heparin Sodium)  5,000 units SUBCUT Q8HR [facility]


   Last Admin: 07/28/20 06:29 Dose:  5,000 units


   Documented by: 


Ondansetron HCl (Zofran)  4 mg IVPUSH Q6H PRN


   PRN Reason: Nausea/Vomiting


   Last Admin: 07/26/20 15:55 Dose:  4 mg


   Documented by: 


Sodium Chloride (Sodium Chloride)  1 gm PO TID [facility]


   Last Admin: 07/28/20 08:39 Dose:  1 gm


   Documented by: 





Discontinued Medications





Sodium Chloride (Normal Saline)  1,000 mls @ 999 mls/hr IV .BOLUS ONE


   Stop: 07/26/20 14:34


   Last Admin: 07/26/20 13:46 Dose:  999 mls/hr


   Documented by: 


Sodium Chloride (Normal Saline)  1,000 mls @ 75 mls/hr IV ASDIRECTED [facility]


   Last Admin: 07/27/20 00:54 Dose:  75 mls/hr


   Documented by: 


Potassium Chloride (Klor-Con 10)  40 meq PO ONETIME ONE


   Stop: 07/28/20 10:32


   Last Admin: 07/28/20 10:53 Dose:  40 meq


   Documented by: 











- Exam


General: Reports: Alert, Oriented, Cooperative


Neck: Reports: Supple


Lungs: Reports: Clear to Auscultation, Normal Respiratory Effort


Cardiovascular: Reports: Regular Rate, Regular Rhythm


GI/Abdominal Exam: Normal Bowel Sounds, Soft, Non-Tender, No Organomegaly, No 

Distention, No Abnormal Bruit, No Mass, Pelvis Stable

## 2022-12-03 ENCOUNTER — HOSPITAL ENCOUNTER (EMERGENCY)
Dept: HOSPITAL 43 - DL.ED | Age: 72
Discharge: HOME | End: 2022-12-03
Payer: MEDICARE

## 2022-12-03 DIAGNOSIS — Z79.899: ICD-10-CM

## 2022-12-03 DIAGNOSIS — L03.011: Primary | ICD-10-CM

## 2022-12-03 PROCEDURE — 99283 EMERGENCY DEPT VISIT LOW MDM: CPT
